# Patient Record
Sex: FEMALE | Race: BLACK OR AFRICAN AMERICAN | NOT HISPANIC OR LATINO | URBAN - METROPOLITAN AREA
[De-identification: names, ages, dates, MRNs, and addresses within clinical notes are randomized per-mention and may not be internally consistent; named-entity substitution may affect disease eponyms.]

---

## 2022-10-21 ENCOUNTER — LAB OUTSIDE AN ENCOUNTER (OUTPATIENT)
Dept: URBAN - METROPOLITAN AREA CLINIC 52 | Facility: CLINIC | Age: 77
End: 2022-10-21

## 2022-10-21 ENCOUNTER — WEB ENCOUNTER (OUTPATIENT)
Dept: URBAN - METROPOLITAN AREA CLINIC 52 | Facility: CLINIC | Age: 77
End: 2022-10-21

## 2022-10-21 ENCOUNTER — DASHBOARD ENCOUNTERS (OUTPATIENT)
Age: 77
End: 2022-10-21

## 2022-10-21 ENCOUNTER — OFFICE VISIT (OUTPATIENT)
Dept: URBAN - METROPOLITAN AREA CLINIC 52 | Facility: CLINIC | Age: 77
End: 2022-10-21

## 2022-10-21 ENCOUNTER — OFFICE VISIT (OUTPATIENT)
Dept: URBAN - METROPOLITAN AREA CLINIC 52 | Facility: CLINIC | Age: 77
End: 2022-10-21
Payer: MEDICARE

## 2022-10-21 VITALS
TEMPERATURE: 98.1 F | OXYGEN SATURATION: 100 % | WEIGHT: 132 LBS | HEIGHT: 65 IN | DIASTOLIC BLOOD PRESSURE: 77 MMHG | BODY MASS INDEX: 21.99 KG/M2 | HEART RATE: 111 BPM | SYSTOLIC BLOOD PRESSURE: 126 MMHG

## 2022-10-21 DIAGNOSIS — Z12.11 COLON CANCER SCREENING: ICD-10-CM

## 2022-10-21 DIAGNOSIS — R63.4 UNINTENTIONAL WEIGHT LOSS: ICD-10-CM

## 2022-10-21 DIAGNOSIS — R19.4 CHANGE IN BOWEL HABITS: ICD-10-CM

## 2022-10-21 DIAGNOSIS — R10.84 GENERALIZED ABDOMINAL PAIN: ICD-10-CM

## 2022-10-21 DIAGNOSIS — R63.0 DECREASED APPETITE: ICD-10-CM

## 2022-10-21 DIAGNOSIS — Z72.0 TOBACCO USE: ICD-10-CM

## 2022-10-21 PROCEDURE — 99204 OFFICE O/P NEW MOD 45 MIN: CPT | Performed by: PHYSICIAN ASSISTANT

## 2022-10-21 RX ORDER — LEVOTHYROXINE SODIUM 125 UG/1
TAKE ONE TABLET BY MOUTH EVERY MORNING TABLET ORAL
Qty: 90 UNSPECIFIED | Refills: 0 | Status: ACTIVE | COMMUNITY

## 2022-10-21 RX ORDER — FLUTICASONE PROPIONATE 50 UG/1
USE TWO SPRAYS IN EACH NOSTRIL ONE TIME DAILY SPRAY, METERED NASAL
Qty: 16 UNSPECIFIED | Refills: 0 | Status: ON HOLD | COMMUNITY

## 2022-10-21 RX ORDER — AMLODIPINE BESYLATE 5 MG/1
TAKE ONE TABLET BY MOUTH ONE TIME DAILY TABLET ORAL
Qty: 90 UNSPECIFIED | Refills: 0 | Status: ON HOLD | COMMUNITY

## 2022-10-21 RX ORDER — DICYCLOMINE HYDROCHLORIDE 10 MG/1
1 CAPSULES CAPSULE ORAL
Qty: 60 | Refills: 0 | OUTPATIENT
Start: 2022-10-21 | End: 2022-11-19

## 2022-10-21 RX ORDER — HYDROCHLOROTHIAZIDE 25 MG/1
TAKE ONE TABLET BY MOUTH ONE TIME DAILY TABLET ORAL
Qty: 90 UNSPECIFIED | Refills: 0 | Status: ON HOLD | COMMUNITY

## 2022-10-21 NOTE — HPI-TODAY'S VISIT:
This is a 76 y.o. female with HTN who presents to office c/o decreased appetite, unintentional weight loss, and mid-abdominal crampy pain. She has loss 22 pounds over 1 month. She also reports change in bowel habits. She use to have regular bowel movements daily, now she can go 2 days between movements. She has never had a colonoscopy. She is a chronic tobacco user. Denies black or bloody stools, denies nausea or vomiting.  Labs from PCP August 2022 with normal CBC, normal CMP, normal TSH.

## 2022-10-24 PROBLEM — 305058001: Status: ACTIVE | Noted: 2022-10-24

## 2022-10-24 PROBLEM — 102614006: Status: ACTIVE | Noted: 2022-10-24

## 2022-10-24 PROBLEM — 88111009 CHANGE IN BOWEL HABIT: Status: ACTIVE | Noted: 2022-10-24

## 2022-10-24 PROBLEM — 448765001 UNINTENTIONAL WEIGHT LOSS: Status: ACTIVE | Noted: 2022-10-24

## 2022-10-28 ENCOUNTER — TELEPHONE ENCOUNTER (OUTPATIENT)
Dept: URBAN - METROPOLITAN AREA CLINIC 52 | Facility: CLINIC | Age: 77
End: 2022-10-28

## 2022-11-19 ENCOUNTER — WEB ENCOUNTER (OUTPATIENT)
Dept: URBAN - METROPOLITAN AREA SURGERY CENTER 17 | Facility: SURGERY CENTER | Age: 77
End: 2022-11-19

## 2022-11-22 ENCOUNTER — CLAIMS CREATED FROM THE CLAIM WINDOW (OUTPATIENT)
Dept: URBAN - METROPOLITAN AREA SURGERY CENTER 17 | Facility: SURGERY CENTER | Age: 77
End: 2022-11-22

## 2022-11-22 ENCOUNTER — LAB OUTSIDE AN ENCOUNTER (OUTPATIENT)
Dept: URBAN - METROPOLITAN AREA CLINIC 92 | Facility: CLINIC | Age: 77
End: 2022-11-22

## 2022-11-22 ENCOUNTER — TELEPHONE ENCOUNTER (OUTPATIENT)
Dept: URBAN - METROPOLITAN AREA CLINIC 92 | Facility: CLINIC | Age: 77
End: 2022-11-22

## 2022-11-22 ENCOUNTER — CLAIMS CREATED FROM THE CLAIM WINDOW (OUTPATIENT)
Dept: URBAN - METROPOLITAN AREA SURGERY CENTER 17 | Facility: SURGERY CENTER | Age: 77
End: 2022-11-22
Payer: MEDICARE

## 2022-11-22 DIAGNOSIS — Z53.8 FAILED ATTEMPTED SURGICAL PROCEDURE: ICD-10-CM

## 2022-11-22 DIAGNOSIS — Z12.11 COLON CANCER SCREENING: ICD-10-CM

## 2022-11-22 PROBLEM — 56294008: Status: ACTIVE | Noted: 2022-11-22

## 2022-11-22 PROBLEM — 79890006: Status: ACTIVE | Noted: 2022-11-22

## 2022-11-22 PROCEDURE — G8907 PT DOC NO EVENTS ON DISCHARG: HCPCS | Performed by: INTERNAL MEDICINE

## 2022-11-22 PROCEDURE — 45378 DIAGNOSTIC COLONOSCOPY: CPT | Performed by: INTERNAL MEDICINE

## 2022-11-22 RX ORDER — FLUTICASONE PROPIONATE 50 UG/1
USE TWO SPRAYS IN EACH NOSTRIL ONE TIME DAILY SPRAY, METERED NASAL
Qty: 16 UNSPECIFIED | Refills: 0 | Status: ON HOLD | COMMUNITY

## 2022-11-22 RX ORDER — AMLODIPINE BESYLATE 5 MG/1
TAKE ONE TABLET BY MOUTH ONE TIME DAILY TABLET ORAL
Qty: 90 UNSPECIFIED | Refills: 0 | Status: ON HOLD | COMMUNITY

## 2022-11-22 RX ORDER — LEVOTHYROXINE SODIUM 125 UG/1
TAKE ONE TABLET BY MOUTH EVERY MORNING TABLET ORAL
Qty: 90 UNSPECIFIED | Refills: 0 | Status: ACTIVE | COMMUNITY

## 2022-11-22 RX ORDER — POLYETHYLENE GLYCOL 3350, SODIUM CHLORIDE, SODIUM BICARBONATE, POTASSIUM CHLORIDE 420; 11.2; 5.72; 1.48 G/4L; G/4L; G/4L; G/4L
AS DIRECTED POWDER, FOR SOLUTION ORAL
Qty: 420 GRAM | Refills: 0 | OUTPATIENT
Start: 2022-11-22 | End: 2022-11-23

## 2022-11-22 RX ORDER — HYDROCHLOROTHIAZIDE 25 MG/1
TAKE ONE TABLET BY MOUTH ONE TIME DAILY TABLET ORAL
Qty: 90 UNSPECIFIED | Refills: 0 | Status: ON HOLD | COMMUNITY

## 2022-11-23 ENCOUNTER — CLAIMS CREATED FROM THE CLAIM WINDOW (OUTPATIENT)
Dept: URBAN - METROPOLITAN AREA SURGERY CENTER 17 | Facility: SURGERY CENTER | Age: 77
End: 2022-11-23

## 2022-11-23 ENCOUNTER — CLAIMS CREATED FROM THE CLAIM WINDOW (OUTPATIENT)
Dept: URBAN - METROPOLITAN AREA SURGERY CENTER 17 | Facility: SURGERY CENTER | Age: 77
End: 2022-11-23
Payer: MEDICARE

## 2022-11-23 ENCOUNTER — TELEPHONE ENCOUNTER (OUTPATIENT)
Dept: URBAN - METROPOLITAN AREA CLINIC 92 | Facility: CLINIC | Age: 77
End: 2022-11-23

## 2022-11-23 ENCOUNTER — LAB OUTSIDE AN ENCOUNTER (OUTPATIENT)
Dept: URBAN - METROPOLITAN AREA CLINIC 92 | Facility: CLINIC | Age: 77
End: 2022-11-23

## 2022-11-23 ENCOUNTER — CLAIMS CREATED FROM THE CLAIM WINDOW (OUTPATIENT)
Dept: URBAN - METROPOLITAN AREA CLINIC 4 | Facility: CLINIC | Age: 77
End: 2022-11-23
Payer: MEDICARE

## 2022-11-23 DIAGNOSIS — D12.5 ADENOMA OF SIGMOID COLON: ICD-10-CM

## 2022-11-23 DIAGNOSIS — D12.4 ADENOMA OF DESCENDING COLON: ICD-10-CM

## 2022-11-23 DIAGNOSIS — K31.A0 GASTRIC INTESTINAL METAPLASIA, UNSPECIFIED: ICD-10-CM

## 2022-11-23 DIAGNOSIS — R93.3 ABN FINDINGS-GI TRACT: ICD-10-CM

## 2022-11-23 DIAGNOSIS — D12.0 ADENOMA OF CECUM: ICD-10-CM

## 2022-11-23 DIAGNOSIS — K29.60 ADENOPAPILLOMATOSIS GASTRICA: ICD-10-CM

## 2022-11-23 LAB
A/G RATIO: 1
ABSOLUTE BASOPHILS: 42
ABSOLUTE EOSINOPHILS: 99
ABSOLUTE LYMPHOCYTES: 1570
ABSOLUTE MONOCYTES: 447
ABSOLUTE NEUTROPHILS: 3042
ALBUMIN: 3.9
ALKALINE PHOSPHATASE: 351
ALT (SGPT): 22
AST (SGOT): 31
BASOPHILS: 0.8
BILIRUBIN, TOTAL: 0.5
BUN/CREATININE RATIO: (no result)
BUN: 10
CALCIUM: 9.7
CARBON DIOXIDE, TOTAL: 25
CHLORIDE: 95
CREATININE: 0.81
EGFR: 75
EOSINOPHILS: 1.9
FREE THYROXINE INDEX: 1.3
GLOBULIN, TOTAL: 3.8
GLUCOSE: 75
HEMATOCRIT: 41.2
HEMOGLOBIN: 13.4
LYMPHOCYTES: 30.2
MCH: 24.9
MCHC: 32.5
MCV: 76.4
MONOCYTES: 8.6
MPV: 10
NEUTROPHILS: 58.5
PLATELET COUNT: 370
POTASSIUM: 4.6
PROTEIN, TOTAL: 7.7
RDW: 15.8
RED BLOOD CELL COUNT: 5.39
SODIUM: 132
T3 UPTAKE: 29
THYROXINE (T4): 4.5
TSH: 43.28
WHITE BLOOD CELL COUNT: 5.2

## 2022-11-23 PROCEDURE — G8907 PT DOC NO EVENTS ON DISCHARG: HCPCS | Performed by: INTERNAL MEDICINE

## 2022-11-23 PROCEDURE — 43239 EGD BIOPSY SINGLE/MULTIPLE: CPT | Performed by: INTERNAL MEDICINE

## 2022-11-23 PROCEDURE — 88305 TISSUE EXAM BY PATHOLOGIST: CPT | Performed by: PATHOLOGY

## 2022-11-23 PROCEDURE — 45385 COLONOSCOPY W/LESION REMOVAL: CPT | Performed by: INTERNAL MEDICINE

## 2022-11-23 PROCEDURE — 88312 SPECIAL STAINS GROUP 1: CPT | Performed by: PATHOLOGY

## 2022-11-23 RX ORDER — LEVOTHYROXINE SODIUM 125 UG/1
TAKE ONE TABLET BY MOUTH EVERY MORNING TABLET ORAL
Qty: 90 UNSPECIFIED | Refills: 0 | Status: ACTIVE | COMMUNITY

## 2022-11-23 RX ORDER — AMLODIPINE BESYLATE 5 MG/1
TAKE ONE TABLET BY MOUTH ONE TIME DAILY TABLET ORAL
Qty: 90 UNSPECIFIED | Refills: 0 | Status: ON HOLD | COMMUNITY

## 2022-11-23 RX ORDER — HYDROCHLOROTHIAZIDE 25 MG/1
TAKE ONE TABLET BY MOUTH ONE TIME DAILY TABLET ORAL
Qty: 90 UNSPECIFIED | Refills: 0 | Status: ON HOLD | COMMUNITY

## 2022-11-23 RX ORDER — POLYETHYLENE GLYCOL 3350, SODIUM CHLORIDE, SODIUM BICARBONATE, POTASSIUM CHLORIDE 420; 11.2; 5.72; 1.48 G/4L; G/4L; G/4L; G/4L
AS DIRECTED POWDER, FOR SOLUTION ORAL
Qty: 420 GRAM | Refills: 0 | Status: ACTIVE | COMMUNITY
Start: 2022-11-22 | End: 2022-11-23

## 2022-11-23 RX ORDER — FLUTICASONE PROPIONATE 50 UG/1
USE TWO SPRAYS IN EACH NOSTRIL ONE TIME DAILY SPRAY, METERED NASAL
Qty: 16 UNSPECIFIED | Refills: 0 | Status: ON HOLD | COMMUNITY

## 2022-11-30 ENCOUNTER — TELEPHONE ENCOUNTER (OUTPATIENT)
Dept: URBAN - METROPOLITAN AREA CLINIC 52 | Facility: CLINIC | Age: 77
End: 2022-11-30

## 2022-11-30 ENCOUNTER — TELEPHONE ENCOUNTER (OUTPATIENT)
Dept: URBAN - METROPOLITAN AREA CLINIC 92 | Facility: CLINIC | Age: 77
End: 2022-11-30

## 2022-12-06 ENCOUNTER — TELEPHONE ENCOUNTER (OUTPATIENT)
Dept: URBAN - METROPOLITAN AREA CLINIC 52 | Facility: CLINIC | Age: 77
End: 2022-12-06

## 2022-12-06 RX ORDER — DICYCLOMINE HYDROCHLORIDE 10 MG/1
1 CAPSULE CAPSULE ORAL
Qty: 60 | Refills: 1
Start: 2022-10-21 | End: 2023-02-05

## 2022-12-07 ENCOUNTER — OFFICE VISIT (OUTPATIENT)
Dept: URBAN - METROPOLITAN AREA CLINIC 52 | Facility: CLINIC | Age: 77
End: 2022-12-07

## 2022-12-07 PROBLEM — 10743008: Status: ACTIVE | Noted: 2022-12-07

## 2022-12-07 RX ORDER — AMLODIPINE BESYLATE 5 MG/1
TAKE ONE TABLET BY MOUTH ONE TIME DAILY TABLET ORAL
Qty: 90 UNSPECIFIED | Refills: 0 | Status: ON HOLD | COMMUNITY

## 2022-12-07 RX ORDER — FLUTICASONE PROPIONATE 50 UG/1
USE TWO SPRAYS IN EACH NOSTRIL ONE TIME DAILY SPRAY, METERED NASAL
Qty: 16 UNSPECIFIED | Refills: 0 | Status: ON HOLD | COMMUNITY

## 2022-12-07 RX ORDER — LEVOTHYROXINE SODIUM 125 UG/1
TAKE ONE TABLET BY MOUTH EVERY MORNING TABLET ORAL
Qty: 90 UNSPECIFIED | Refills: 0 | Status: ACTIVE | COMMUNITY

## 2022-12-07 RX ORDER — HYDROCHLOROTHIAZIDE 25 MG/1
TAKE ONE TABLET BY MOUTH ONE TIME DAILY TABLET ORAL
Qty: 90 UNSPECIFIED | Refills: 0 | Status: ON HOLD | COMMUNITY

## 2022-12-19 ENCOUNTER — TELEPHONE ENCOUNTER (OUTPATIENT)
Dept: URBAN - METROPOLITAN AREA CLINIC 52 | Facility: CLINIC | Age: 77
End: 2022-12-19

## 2022-12-19 RX ORDER — LEVOTHYROXINE SODIUM 125 UG/1
TAKE ONE TABLET BY MOUTH EVERY MORNING TABLET ORAL
Qty: 90 UNSPECIFIED | Refills: 0 | Status: ACTIVE | COMMUNITY

## 2022-12-19 RX ORDER — DICYCLOMINE HYDROCHLORIDE 10 MG/1
1 CAPSULE CAPSULE ORAL
Qty: 60 | Refills: 1 | Status: ACTIVE | COMMUNITY
Start: 2022-10-21 | End: 2023-02-05

## 2022-12-19 RX ORDER — HYDROCHLOROTHIAZIDE 25 MG/1
TAKE ONE TABLET BY MOUTH ONE TIME DAILY TABLET ORAL
Qty: 90 UNSPECIFIED | Refills: 0 | Status: ON HOLD | COMMUNITY

## 2022-12-19 RX ORDER — MIRTAZAPINE 7.5 MG/1
1 TABLETS AT BEDTIME TABLET, FILM COATED ORAL ONCE A DAY
Qty: 30 | Refills: 0 | OUTPATIENT
Start: 2022-12-19

## 2022-12-19 RX ORDER — AMLODIPINE BESYLATE 5 MG/1
TAKE ONE TABLET BY MOUTH ONE TIME DAILY TABLET ORAL
Qty: 90 UNSPECIFIED | Refills: 0 | Status: ON HOLD | COMMUNITY

## 2022-12-19 RX ORDER — FLUTICASONE PROPIONATE 50 UG/1
USE TWO SPRAYS IN EACH NOSTRIL ONE TIME DAILY SPRAY, METERED NASAL
Qty: 16 UNSPECIFIED | Refills: 0 | Status: ON HOLD | COMMUNITY

## 2022-12-24 ENCOUNTER — CLAIMS CREATED FROM THE CLAIM WINDOW (OUTPATIENT)
Dept: URBAN - METROPOLITAN AREA MEDICAL CENTER 34 | Facility: MEDICAL CENTER | Age: 77
End: 2022-12-24

## 2022-12-24 ENCOUNTER — CLAIMS CREATED FROM THE CLAIM WINDOW (OUTPATIENT)
Dept: URBAN - METROPOLITAN AREA MEDICAL CENTER 34 | Facility: MEDICAL CENTER | Age: 77
End: 2022-12-24
Payer: MEDICARE

## 2022-12-24 DIAGNOSIS — Z12.11 COLON CANCER SCREENING: ICD-10-CM

## 2022-12-24 DIAGNOSIS — R19.7 ACUTE DIARRHEA: ICD-10-CM

## 2022-12-24 DIAGNOSIS — R93.2 ABN FIND-BILIARY TRACT: ICD-10-CM

## 2022-12-24 PROCEDURE — 996 AG2 NON-BILLABLE: Performed by: INTERNAL MEDICINE

## 2022-12-24 PROCEDURE — G8427 DOCREV CUR MEDS BY ELIG CLIN: HCPCS | Performed by: PHYSICIAN ASSISTANT

## 2022-12-24 PROCEDURE — 99222 1ST HOSP IP/OBS MODERATE 55: CPT | Performed by: PHYSICIAN ASSISTANT

## 2022-12-25 ENCOUNTER — OUT OF OFFICE VISIT (OUTPATIENT)
Dept: URBAN - METROPOLITAN AREA MEDICAL CENTER 34 | Facility: MEDICAL CENTER | Age: 77
End: 2022-12-25
Payer: MEDICARE

## 2022-12-25 DIAGNOSIS — E86.0 DEHYDRATION: ICD-10-CM

## 2022-12-25 DIAGNOSIS — U07.1 COVID: ICD-10-CM

## 2022-12-25 DIAGNOSIS — R19.7 ACUTE DIARRHEA: ICD-10-CM

## 2022-12-25 PROCEDURE — 99232 SBSQ HOSP IP/OBS MODERATE 35: CPT | Performed by: INTERNAL MEDICINE

## 2023-01-06 ENCOUNTER — OFFICE VISIT (OUTPATIENT)
Dept: URBAN - METROPOLITAN AREA CLINIC 52 | Facility: CLINIC | Age: 78
End: 2023-01-06